# Patient Record
Sex: MALE | Race: ASIAN | NOT HISPANIC OR LATINO | Employment: OTHER | URBAN - METROPOLITAN AREA
[De-identification: names, ages, dates, MRNs, and addresses within clinical notes are randomized per-mention and may not be internally consistent; named-entity substitution may affect disease eponyms.]

---

## 2023-11-08 ENCOUNTER — HOSPITAL ENCOUNTER (EMERGENCY)
Facility: CLINIC | Age: 73
Discharge: HOME | End: 2023-11-08
Attending: STUDENT IN AN ORGANIZED HEALTH CARE EDUCATION/TRAINING PROGRAM | Admitting: STUDENT IN AN ORGANIZED HEALTH CARE EDUCATION/TRAINING PROGRAM
Payer: COMMERCIAL

## 2023-11-08 VITALS
HEART RATE: 73 BPM | RESPIRATION RATE: 18 BRPM | OXYGEN SATURATION: 98 % | DIASTOLIC BLOOD PRESSURE: 68 MMHG | SYSTOLIC BLOOD PRESSURE: 126 MMHG | TEMPERATURE: 97.6 F

## 2023-11-08 DIAGNOSIS — R39.15 URINARY URGENCY: ICD-10-CM

## 2023-11-08 DIAGNOSIS — R35.0 URINARY FREQUENCY: ICD-10-CM

## 2023-11-08 DIAGNOSIS — R31.0 GROSS HEMATURIA: Primary | ICD-10-CM

## 2023-11-08 PROBLEM — R45.89 DEPRESSED MOOD: Status: ACTIVE | Noted: 2018-03-14

## 2023-11-08 PROBLEM — E11.9 TYPE 2 DIABETES MELLITUS WITHOUT COMPLICATIONS (H): Status: ACTIVE | Noted: 2023-11-08

## 2023-11-08 PROBLEM — K21.9 LARYNGOPHARYNGEAL REFLUX: Status: ACTIVE | Noted: 2018-03-14

## 2023-11-08 PROBLEM — E78.1 HYPERTRIGLYCERIDEMIA: Status: ACTIVE | Noted: 2018-03-14

## 2023-11-08 PROBLEM — I10 ESSENTIAL (PRIMARY) HYPERTENSION: Status: ACTIVE | Noted: 2023-11-08

## 2023-11-08 PROBLEM — N18.30 CKD (CHRONIC KIDNEY DISEASE) STAGE 3, GFR 30-59 ML/MIN (H): Status: ACTIVE | Noted: 2019-05-30

## 2023-11-08 PROBLEM — J18.9 PNEUMONIA: Status: ACTIVE | Noted: 2023-11-08

## 2023-11-08 PROBLEM — F32.A DEPRESSIVE DISORDER: Status: ACTIVE | Noted: 2023-11-08

## 2023-11-08 PROBLEM — R21 RASH: Status: ACTIVE | Noted: 2023-11-08

## 2023-11-08 LAB
ALBUMIN UR-MCNC: 200 MG/DL
APPEARANCE UR: CLEAR
BACTERIA #/AREA URNS HPF: ABNORMAL /HPF
BILIRUB UR QL STRIP: NEGATIVE
COLOR UR AUTO: ABNORMAL
GLUCOSE UR STRIP-MCNC: NEGATIVE MG/DL
HGB UR QL STRIP: ABNORMAL
KETONES UR STRIP-MCNC: NEGATIVE MG/DL
LEUKOCYTE ESTERASE UR QL STRIP: ABNORMAL
MUCOUS THREADS #/AREA URNS LPF: PRESENT /LPF
NITRATE UR QL: NEGATIVE
PH UR STRIP: 7 [PH] (ref 5–7)
RBC URINE: >182 /HPF
SP GR UR STRIP: 1.01 (ref 1–1.03)
SQUAMOUS EPITHELIAL: <1 /HPF
UROBILINOGEN UR STRIP-MCNC: NORMAL MG/DL
WBC URINE: 38 /HPF

## 2023-11-08 PROCEDURE — 81001 URINALYSIS AUTO W/SCOPE: CPT | Performed by: EMERGENCY MEDICINE

## 2023-11-08 PROCEDURE — 87086 URINE CULTURE/COLONY COUNT: CPT | Performed by: STUDENT IN AN ORGANIZED HEALTH CARE EDUCATION/TRAINING PROGRAM

## 2023-11-08 PROCEDURE — 81001 URINALYSIS AUTO W/SCOPE: CPT | Performed by: STUDENT IN AN ORGANIZED HEALTH CARE EDUCATION/TRAINING PROGRAM

## 2023-11-08 PROCEDURE — 99283 EMERGENCY DEPT VISIT LOW MDM: CPT

## 2023-11-08 RX ORDER — CEPHALEXIN 500 MG/1
500 CAPSULE ORAL 4 TIMES DAILY
Qty: 28 CAPSULE | Refills: 0 | Status: SHIPPED | OUTPATIENT
Start: 2023-11-08 | End: 2023-11-15

## 2023-11-08 ASSESSMENT — ACTIVITIES OF DAILY LIVING (ADL): ADLS_ACUITY_SCORE: 33

## 2023-11-08 NOTE — ED PROVIDER NOTES
History   Chief Complaint:  Hematuria       HPI:  Cl Daily is a very pleasant 73 year old male presenting with hematuria.  Patient has noted darker urine and some blood in his urine over the last 2 days.  He has also noticed increased urinary frequency and feeling he is not emptying his bladder properly.  No fever or chills.  No dysuria.  No abdominal pain, back pain or flank pain.  Patient otherwise feels well.  No history of urologic procedures.  Patient does have a history of CKD.    Independent Historian:  None. Only the patient provided history.    Review of External Notes:  None.    I personally reviewed the patient's chart, including available medication list and available past medical history, past surgical history, family history, and social history.    Physical Exam   Patient Vitals for the past 24 hrs:   BP Temp Temp src Pulse Resp SpO2   11/08/23 1549 126/68 97.6  F (36.4  C) Temporal 73 18 98 %      Physical Exam  Constitutional:       General: He is not in acute distress.     Appearance: Normal appearance. He is not toxic-appearing.   Cardiovascular:      Rate and Rhythm: Normal rate.   Abdominal:      General: Abdomen is flat.      Palpations: Abdomen is soft.      Tenderness: There is no abdominal tenderness. There is no right CVA tenderness, left CVA tenderness, guarding or rebound.   Skin:     General: Skin is warm and dry.   Neurological:      Mental Status: He is alert.       Emergency Department Course     Imaging & ECG: Laboratory:   No orders to display     Labs Ordered and Resulted from Time of ED Arrival to Time of ED Departure   UA MACROSCOPIC WITH REFLEX TO MICRO AND CULTURE - Abnormal       Result Value    Color Urine Straw      Appearance Urine Clear      Glucose Urine Negative      Bilirubin Urine Negative      Ketones Urine Negative      Specific Gravity Urine 1.008      Blood Urine Large (*)     pH Urine 7.0      Protein Albumin Urine 200 (*)     Urobilinogen Urine Normal       Nitrite Urine Negative      Leukocyte Esterase Urine Small (*)     Bacteria Urine Few (*)     Mucus Urine Present (*)     RBC Urine >182 (*)     WBC Urine 38 (*)     Squamous Epithelials Urine <1     URINE CULTURE         Procedures  None performed    Interventions & Assessments:           Interventions:  Medications - No data to display     Assessments:  1600  I obtained history and performed initial assessment of the patient.     Independent Interpretation (X-rays, CTs, rhythm strip):  None    Consultations/Discussion of Management or Tests:  None    Social Determinants of Health affecting care:   None.      Disposition:  The patient was discharged to home.     Impression & Plan        Medical Decision Making:   Patient presenting with hematuria, urgency and frequency.  Vital signs on arrival reassuring.  Considered differential including urinary tract infection, malignancy, other.  Low suspicion for sepsis physiology or pyelonephritis.  Low suspicion for nephrolithiasis given absence of pain.  Obtain urinalysis.  This showed a large amount of hematuria along with pyuria and leukocyte esterase.  We will plan for starting patient on antibiotics for possible UTI.  We will also place urology referral for evaluation if patient's hematuria is persistent despite antibiotics. Findings were discussed.   Additional verbal instructions were provided.   I discussed specific warning signs and instructed the patient to return to the emergency department if there are any concerns.  Understanding of instructions was voiced, questions were answered and the patient was discharged.         Diagnosis:    ICD-10-CM    1. Gross hematuria  R31.0 Adult Urology  Referral      2. Urinary urgency  R39.15       3. Urinary frequency  R35.0            Discharge Medications:  Discharge Medication List as of 11/8/2023  5:15 PM        START taking these medications    Details   cephALEXin (KEFLEX) 500 MG capsule Take 1 capsule (500 mg)  by mouth 4 times daily for 7 days, Disp-28 capsule, R-0, Local Print              Palomo Culp MD  11/09/23 0020

## 2023-11-08 NOTE — ED TRIAGE NOTES
Pt reports dark yellow urine and noticed blood in urine that started today as well     Pt also feeling frequency as well no pain associated     No pain any where but feels like not emptying bladder completley     Pt also reports chronic fatigue for the last year

## 2023-11-08 NOTE — DISCHARGE INSTRUCTIONS
Thank you for allowing us to evaluate you today.  Follow up with primary care clinician  in 1 week for reevaluation.. If you do not improve with antibiotics after 3 days, make an appointment with urology. If you stop urinating, return here  Take the antibiotic(s) as prescribed.   Please read the guidance provided with your discharge instructions.  Immediately return to the emergency department with any concerns.

## 2023-11-10 LAB — BACTERIA UR CULT: NORMAL
